# Patient Record
Sex: FEMALE | Race: WHITE | Employment: FULL TIME
[De-identification: names, ages, dates, MRNs, and addresses within clinical notes are randomized per-mention and may not be internally consistent; named-entity substitution may affect disease eponyms.]

---

## 2018-12-10 ENCOUNTER — RECORDS - HEALTHEAST (OUTPATIENT)
Dept: ADMINISTRATIVE | Facility: OTHER | Age: 48
End: 2018-12-10

## 2018-12-12 ENCOUNTER — HOSPITAL ENCOUNTER (OUTPATIENT)
Dept: CT IMAGING | Facility: CLINIC | Age: 48
Discharge: HOME | End: 2018-12-12
Attending: INTERNAL MEDICINE

## 2018-12-12 DIAGNOSIS — K52.9 COLITIS: ICD-10-CM

## 2018-12-12 DIAGNOSIS — R10.30 LOWER ABDOMINAL PAIN: ICD-10-CM

## 2020-05-13 ENCOUNTER — HOSPITAL ENCOUNTER (EMERGENCY)
Facility: CLINIC | Age: 50
Discharge: HOME | End: 2020-05-13
Attending: EMERGENCY MEDICINE | Admitting: EMERGENCY MEDICINE
Payer: COMMERCIAL

## 2020-05-13 VITALS
SYSTOLIC BLOOD PRESSURE: 119 MMHG | HEART RATE: 92 BPM | RESPIRATION RATE: 18 BRPM | DIASTOLIC BLOOD PRESSURE: 75 MMHG | OXYGEN SATURATION: 99 % | TEMPERATURE: 98 F

## 2020-05-13 DIAGNOSIS — S01.81XA FACIAL LACERATION, INITIAL ENCOUNTER: ICD-10-CM

## 2020-05-13 DIAGNOSIS — S09.90XA CLOSED HEAD INJURY, INITIAL ENCOUNTER: ICD-10-CM

## 2020-05-13 PROBLEM — K52.9 COLITIS: Status: ACTIVE | Noted: 2018-11-27

## 2020-05-13 PROBLEM — N93.9 ABNORMAL UTERINE BLEEDING: Status: ACTIVE | Noted: 2018-03-27

## 2020-05-13 PROBLEM — G43.109 MIGRAINE WITH AURA, NOT INTRACTABLE, WITHOUT STATUS MIGRAINOSUS: Status: ACTIVE | Noted: 2018-03-27

## 2020-05-13 PROBLEM — F98.8 ADD (ATTENTION DEFICIT DISORDER): Status: ACTIVE | Noted: 2018-03-27

## 2020-05-13 PROBLEM — K63.89 PNEUMATOSIS COLI: Status: ACTIVE | Noted: 2018-12-31

## 2020-05-13 PROBLEM — R55 VASOVAGAL SYNCOPE: Status: ACTIVE | Noted: 2018-12-31

## 2020-05-13 PROBLEM — H90.5 CONGENITAL DEAFNESS: Status: ACTIVE | Noted: 2018-03-27

## 2020-05-13 PROBLEM — R10.9 ABDOMINAL PAIN: Status: ACTIVE | Noted: 2018-11-25

## 2020-05-13 PROCEDURE — 25000132 ZZH RX MED GY IP 250 OP 250 PS 637: Performed by: EMERGENCY MEDICINE

## 2020-05-13 PROCEDURE — 12013 RPR F/E/E/N/L/M 2.6-5.0 CM: CPT

## 2020-05-13 PROCEDURE — 90471 IMMUNIZATION ADMIN: CPT

## 2020-05-13 PROCEDURE — 25000128 H RX IP 250 OP 636: Performed by: EMERGENCY MEDICINE

## 2020-05-13 PROCEDURE — 90715 TDAP VACCINE 7 YRS/> IM: CPT | Performed by: EMERGENCY MEDICINE

## 2020-05-13 PROCEDURE — 99283 EMERGENCY DEPT VISIT LOW MDM: CPT | Mod: 25

## 2020-05-13 RX ORDER — ACETAMINOPHEN 500 MG
1000 TABLET ORAL ONCE
Status: COMPLETED | OUTPATIENT
Start: 2020-05-13 | End: 2020-05-13

## 2020-05-13 RX ORDER — METHYLCELLULOSE 4000CPS 30 %
POWDER (GRAM) MISCELLANEOUS ONCE
Status: DISCONTINUED | OUTPATIENT
Start: 2020-05-13 | End: 2020-05-13 | Stop reason: HOSPADM

## 2020-05-13 RX ADMIN — ACETAMINOPHEN 1000 MG: 500 TABLET, FILM COATED ORAL at 11:52

## 2020-05-13 RX ADMIN — CLOSTRIDIUM TETANI TOXOID ANTIGEN (FORMALDEHYDE INACTIVATED), CORYNEBACTERIUM DIPHTHERIAE TOXOID ANTIGEN (FORMALDEHYDE INACTIVATED), BORDETELLA PERTUSSIS TOXOID ANTIGEN (GLUTARALDEHYDE INACTIVATED), BORDETELLA PERTUSSIS FILAMENTOUS HEMAGGLUTININ ANTIGEN (FORMALDEHYDE INACTIVATED), BORDETELLA PERTUSSIS PERTACTIN ANTIGEN, AND BORDETELLA PERTUSSIS FIMBRIAE 2/3 ANTIGEN 0.5 ML: 5; 2; 2.5; 5; 3; 5 INJECTION, SUSPENSION INTRAMUSCULAR at 13:25

## 2020-05-13 ASSESSMENT — ENCOUNTER SYMPTOMS
NUMBNESS: 0
NAUSEA: 0
VOMITING: 0
EYE PAIN: 0
WOUND: 1

## 2020-05-13 NOTE — ED TRIAGE NOTES
Pt A&Ox4. ABCs intact. Pt got her foot caught and fell and hit her head on a concrete wall. Pt denies LOC and no blood thinners

## 2020-05-13 NOTE — DISCHARGE INSTRUCTIONS
YOUR STITCHES SHOULD BE REMOVED IN 5 DAYS.     Discharge Instructions  Head Injury    You have been seen today for a head injury. Your evaluation included a history and physical examination. You may have had a CT (CAT) scan performed, though most head injuries do not require a scan. Based on this evaluation, your provider today does not feel that your head injury is serious.    Generally, every Emergency Department visit should have a follow-up clinic visit with either a primary or a specialty clinic/provider. Please follow-up as instructed by your emergency provider today.  Return to the Emergency Department if:  You are confused or you are not acting right.  Your headache gets worse or you start to have a really bad headache even with your recommended treatment plan.  You vomit (throw up) more than once.  You have a seizure.  You have trouble walking.  You have weakness or paralysis (cannot move) in an arm or a leg.  You have blood or fluid coming from your ears or nose.  You have new symptoms or anything that worries you.    Sleeping:  It is okay for you to sleep, but someone should wake you up if instructed by your provider, and someone should check on you at your usual time to wake up.     Activity:  Do not drive for at least 24 hours.  Do not drive if you have dizzy spells or trouble concentrating, or remembering things.  Do not return to any contact sports until cleared by your regular provider.     MORE INFORMATION:    Concussion:  A concussion is a minor head injury that may cause temporary problems with the way the brain works. Although concussions are important, they are generally not an emergency or a reason that a person needs to be hospitalized. Some concussion symptoms include confusion, amnesia (forgetful), nausea (sick to your stomach) and vomiting (throwing up), dizziness, fatigue, memory or concentration problems, irritability and sleep problems. For most people, concussions are mild and temporary  but some will have more severe and persistent symptoms that require on-going care and treatment.  CT Scans: Your evaluation today may have included a CT scan (CAT scan) to look for things like bleeding or a skull fracture (broken bone).  CT scans involve radiation and too many CT scans can cause serious health problems like cancer, especially in children.  Because of this, your provider may not have ordered a CT scan today if they think you are at low risk for a serious or life threatening problem.    If you were given a prescription for medicine here today, be sure to read all of the information (including the package insert) that comes with your prescription.  This will include important information about the medicine, its side effects, and any warnings that you need to know about.  The pharmacist who fills the prescription can provide more information and answer questions you may have about the medicine.  If you have questions or concerns that the pharmacist cannot address, please call or return to the Emergency Department.     Remember that you can always come back to the Emergency Department if you are not able to see your regular provider in the amount of time listed above, if you get any new symptoms, or if there is anything that worries you.

## 2020-05-13 NOTE — ED AVS SNAPSHOT
[facility] Emergency Department  [address]  [facility] [address]  Phone:  [phone]  Fax:  [phone]                                    Winnie Bravo   MRN: [id number]    Department:  [facility] Emergency Department   Date of Visit:  5/13/2020           After Visit Summary Signature Page    I have received my discharge instructions, and my questions have been answered. I have discussed any challenges I see with this plan with the nurse or doctor.    ..........................................................................................................................................  Patient/Patient Representative Signature      ..........................................................................................................................................  Patient Representative Print Name and Relationship to Patient    ..................................................               ................................................  Date                                   Time    ..........................................................................................................................................  Reviewed by Signature/Title    ...................................................              ..............................................  Date                                               Time          22EPIC Rev 08/18

## 2020-05-13 NOTE — ED PROVIDER NOTES
History     Chief Complaint:  Laceration     HPI   Winnie Bravo is a 50 year old female who presents for evaluation of a laceration. The patient reports getting her foot caught as she was descending the stairs, causing her to fall forward about 2-3 feet. She endorses hitting her head on the concrete although did not lose consciousness. She presents with a laceration directly superior to her right eyebrow. The bleeding of the laceration was controlled prior to arrival and the patient has been applying an cold compress to the wound. The patient denies any neck pain, severe headache, numbness or tingling of the extremities, nose pain, nosebleeds, dental pain/broken teeth, eye pain, or visual disturbances. She denies any difficulty breathing, nausea, or vomiting. She did not sustain any other injuries. The patient is not on any anticoagulation. Per [facility], the patient's last Tdap was in 2009.     Allergies:  No Known Drug Allergies    Medications:   Neurontin  Effexor  Maxalt  Zovirax  Denavir    Past Medical History:    Anxiety  Depression   ADD  Colitis  IBS  Presbyopia   Regular astigmatism, bilateral   Migraine  Alcohol dependence   Recurrent cold sores     Past Surgical History:    Tubal ligation   Endometrial ablation   Hysteroscopy   Pubovaginal sling  Bunionectomy with osteotomy and cheilectomy, right foot  Bunionectomy, left foot  Hand procedure, right     Family History:    Father: alcoholism, diabetes  Mother: alcoholism     Social History:  The patient presents unaccompanied to the ED.  PCP: No primary care provider on file.  Smoking status: Former Smoker  Smokeless tobacco: Never Used  Alcohol use: History of alcohol dependence      Review of Systems   HENT: Negative for dental problem and nosebleeds.         Denies nose pain   Eyes: Negative for pain and visual disturbance.   Respiratory:        Denies difficulty breathing   Gastrointestinal: Negative for nausea and vomiting.   Skin: Positive for wound.    Neurological: Negative for syncope and numbness.   All other systems reviewed and are negative.      Physical Exam     Patient Vitals for the past 24 hrs:   BP Temp Temp src Pulse Resp SpO2   05/13/20 1145 119/75 -- -- 92 -- 99 %   05/13/20 1131 (!) 141/85 98  F (36.7  C) Oral 84 18 96 %       Physical Exam  General: Well appearing, nontoxic.  Resting comfortably  Head:  1.5cm x 1.5cm V shaped laceration to the right forehead just above the right eyebrow. No associated bony tenderness to palpation or crepitus. Bleeding controlled.   Eyes:  Pupils are equal, round, and reactive to light, EOMI, no nystagmus. No pain with eye movement. Right globe appears atraumatic. No periorbital bony tenderness.     Conjunctivae non-injected and sclerae white  ENT:    The external nose is normal, atraumatic, non tender to palpation. The remainder of the facial bones are non tender to palpation and atraumatic.    Pinnae are normal    The oropharynx is normal, mucous membranes moist    Uvula is in the midline. Dentition atraumatic. Tongue normal.   Neck:  Normal range of motion. Cervical spine nontender, no stepoffs     There is no rigidity noted    Trachea is in the midline  CV:  Regular rate and rhythm     Normal S1/S2, no S3/S4    No murmur or rub  Resp:  Lungs are clear and equal bilaterally    There is no tachypnea    No increased work of breathing    No rales, wheezing, or rhonchi  GI:  No distension  MS:  Normal muscular tone    Symmetric motor strength    No lower extremity edema  Skin:  No rash or acute skin lesions noted. Laceration and associated surrounding superficial abrasion to the right forehead. Superficial abrasion to the anterior right foot.   Neuro: A&Ox3, GCS 15    CN II - XII intact    Speech clear, fluent, and normal    Strength 5/5 and symmetric in bilateral upper and lower extremities.    No pronator drift. SILT throughout.    No ankle clonus    FTN testing normal. No tremor.     Gait normal  Psych:  Normal  affect.  Appropriate interactions.      Emergency Department Course     Procedures       Laceration Repair        LACERATION:  A simple clean 1.5 x 1.5 cm V-shape laceration.      LOCATION:  Superior to the right eyebrow      FUNCTION:  Distally sensation, circulation, motor and tendon function are intact.      ANESTHESIA:  LET - Topical      PREPARATION:  Irrigation and Scrubbing with Normal Saline and Shur Clens      DEBRIDEMENT:  no debridement and wound explored, no foreign body found      CLOSURE:  Wound was closed with One Layer.  Skin closed with 5 x 6.0 Ethylon using simple interrupted sutures.     Interventions:  1152 Tylenol 1000 mg PO   1325 Tdap 0.5 mL IM    Emergency Department Course:  Past medical records, nursing notes, and vitals reviewed.   1140 I performed an exam of the patient and obtained history, as documented above.    Medication administered.     1250 I performed a laceration repair, details above.    Tdap updated.     Findings and plan explained to the Patient. Patient discharged home with instructions regarding supportive care, medications, and reasons to return. The importance of close follow-up was reviewed.    Impression & Plan       Medical Decision Making:  Winnie Bravo is a 50 year old female who presents with a fall from standing and a head injury.  The differential diagnosis includes skull fracture, epidural hematoma, subdural hematoma, intracerebral hemorrhage, and traumatic subarachnoid hemorrhage. There are no physical signs of skull fracture or other bony fracture on exam and the patient is well-appearing. Cervical spine is cleared clinically. The head to toe trauma is exam is negative otherwise and further trauma workup is not necessary. Concussion is likewise of very low probability with no loss of consciousness and normal mental status here. The wound was carefully evaluated and explored. The laceration superior to to the right eyebrow was repaired as noted above. CT is not  "indicated due to negative [place] CT rule and lack of any bony tenderness. The patient understands that she must return if any \"red flags\" appear/develop in the coming hours/days, as this may represent an indication to perform a CT scan.  I have noted that \"red flags\" include: lethargy or irritability,  strange behavior, seizures, repeated vomiting, weakness or loss of responsiveness. Closed head injury instructions were given. Sutures should be removed in 5 days. Recommended sunscreen/keep out of sun to the area over the summer to help decrease scarring.  Patient discharged in stable and improved condition. Tdap updated.    Diagnosis:    ICD-10-CM    1. Facial laceration, initial encounter  S01.81XA    2. Closed head injury, initial encounter  S09.90XA         Disposition:  Discharged to home.      Scribe Disclosure:  Tiesha DE LA ROSA, am serving as a scribe at 11:38 AM on 5/13/2020 to document services personally performed by Sancho Watters MD based on my observations and the provider's statements to me.     5/13/2020   Sancho Tolliver MD  05/13/20 2109    "